# Patient Record
Sex: MALE | Race: OTHER | HISPANIC OR LATINO
[De-identification: names, ages, dates, MRNs, and addresses within clinical notes are randomized per-mention and may not be internally consistent; named-entity substitution may affect disease eponyms.]

---

## 2020-06-09 ENCOUNTER — TRANSCRIPTION ENCOUNTER (OUTPATIENT)
Age: 35
End: 2020-06-09

## 2020-10-19 ENCOUNTER — TRANSCRIPTION ENCOUNTER (OUTPATIENT)
Age: 35
End: 2020-10-19

## 2021-07-09 PROBLEM — Z00.00 ENCOUNTER FOR PREVENTIVE HEALTH EXAMINATION: Status: ACTIVE | Noted: 2021-07-09

## 2021-07-23 ENCOUNTER — APPOINTMENT (OUTPATIENT)
Dept: UROLOGY | Facility: CLINIC | Age: 36
End: 2021-07-23
Payer: COMMERCIAL

## 2021-07-23 VITALS
HEIGHT: 68 IN | WEIGHT: 165 LBS | SYSTOLIC BLOOD PRESSURE: 130 MMHG | DIASTOLIC BLOOD PRESSURE: 72 MMHG | BODY MASS INDEX: 25.01 KG/M2

## 2021-07-23 DIAGNOSIS — Z78.9 OTHER SPECIFIED HEALTH STATUS: ICD-10-CM

## 2021-07-23 PROCEDURE — 99203 OFFICE O/P NEW LOW 30 MIN: CPT

## 2021-07-23 NOTE — PHYSICAL EXAM
[General Appearance - Well Developed] : well developed [General Appearance - Well Nourished] : well nourished [Normal Appearance] : normal appearance [Well Groomed] : well groomed [General Appearance - In No Acute Distress] : no acute distress [Edema] : no peripheral edema [Respiration, Rhythm And Depth] : normal respiratory rhythm and effort [Exaggerated Use Of Accessory Muscles For Inspiration] : no accessory muscle use [Abdomen Soft] : soft [Abdomen Tenderness] : non-tender [Costovertebral Angle Tenderness] : no ~M costovertebral angle tenderness [Urethral Meatus] : meatus normal [Penis Abnormality] : normal uncircumcised penis [Scrotum] : the scrotum was normal [Urinary Bladder Findings] : the bladder was normal on palpation [Testes Tenderness] : no tenderness of the testes [Testes Mass (___cm)] : there were no testicular masses [Prostate Enlargement] : the prostate was not enlarged [Prostate Tenderness] : the prostate was not tender [No Prostate Nodules] : no prostate nodules [Normal Station and Gait] : the gait and station were normal for the patient's age [] : no rash [No Focal Deficits] : no focal deficits [Oriented To Time, Place, And Person] : oriented to person, place, and time [Affect] : the affect was normal [Mood] : the mood was normal [Not Anxious] : not anxious [Inguinal Lymph Nodes Enlarged Bilaterally] : inguinal

## 2021-07-23 NOTE — HISTORY OF PRESENT ILLNESS
[FreeTextEntry1] : He is a 36-year-old man who is seen today for initial visit.  He has no significant urological symptoms.  There is no known family history of urological diseases.  He applied for life insurance and lab values in early June 2021 showed PSA of 3.9.  PSA was repeated a few weeks later and was 4.9 with 6% free PSA.  He is here today to discuss the results.

## 2021-07-23 NOTE — ASSESSMENT
[FreeTextEntry1] : Generally prostate cancer screening is not recommended for this age group.  Regardless PSA level was done and it was elevated and on repeat testing it was more elevated with a low free PSA level.  We had a long discussion regarding his PSA level. We discussed different PSA parameters such as PSA velocity, free PSA and age-adjusted PSA level. Other markers such as 4K score, PCA3, Prostate health Index, etc were reviewed.  We also discussed observation with repeat PSA level, prostate or pelvic MRI and prostate biopsy in detail. Risks and benefits of each option were discussed and questions were answered. Patient was made aware that prostate cancer can exist at any PSA level.\par Prostate biopsy was reviewed in detail. We discussed how the procedure is performed. Preparation with oral antibiotics and Fleet enema was reviewed. Trans-rectal and trans-perineal biopsies were discussed. Risks of biopsy especially bacteremia, UTI, sepsis, bleeding, erectile dysfunction, etc were discussed. He was made aware that biopsy may not be able to diagnose prostate cancer. Questions were answered.  Pending insurance approval, MRI of the prostate will be ordered and then we will discuss the next step on the phone.\par \par Tobias Sierra MD, FACS\par [facility]\par  of Urology\par \par [address], [address]\[place] [address]\par \par [address], [address]\[place] [address]\par \par Tel: [phone]\par Fax: [phone]

## 2021-07-23 NOTE — LETTER BODY
[Dear  ___] : Dear  [unfilled], [Consult Letter:] : I had the pleasure of evaluating your patient, [unfilled]. [Consult Closing:] : Thank you very much for allowing me to participate in the care of this patient.  If you have any questions, please do not hesitate to contact me. [FreeTextEntry1] : [address]\[place] [address]\par [phone]

## 2021-08-04 ENCOUNTER — RESULT REVIEW (OUTPATIENT)
Age: 36
End: 2021-08-04

## 2021-08-04 ENCOUNTER — OUTPATIENT (OUTPATIENT)
Dept: OUTPATIENT SERVICES | Facility: HOSPITAL | Age: 36
LOS: 1 days | End: 2021-08-04
Payer: COMMERCIAL

## 2021-08-04 ENCOUNTER — APPOINTMENT (OUTPATIENT)
Dept: MRI IMAGING | Facility: IMAGING CENTER | Age: 36
End: 2021-08-04
Payer: COMMERCIAL

## 2021-08-04 ENCOUNTER — TRANSCRIPTION ENCOUNTER (OUTPATIENT)
Age: 36
End: 2021-08-04

## 2021-08-04 DIAGNOSIS — R97.20 ELEVATED PROSTATE SPECIFIC ANTIGEN [PSA]: ICD-10-CM

## 2021-08-04 PROCEDURE — A9585: CPT

## 2021-08-04 PROCEDURE — 76498 UNLISTED MR PROCEDURE: CPT

## 2021-08-04 PROCEDURE — 76498 UNLISTED MR PROCEDURE: CPT | Mod: 26

## 2021-08-04 PROCEDURE — 72197 MRI PELVIS W/O & W/DYE: CPT

## 2021-08-04 PROCEDURE — 72197 MRI PELVIS W/O & W/DYE: CPT | Mod: 26

## 2021-08-08 ENCOUNTER — NON-APPOINTMENT (OUTPATIENT)
Age: 36
End: 2021-08-08

## 2021-09-08 ENCOUNTER — APPOINTMENT (OUTPATIENT)
Dept: UROLOGY | Facility: CLINIC | Age: 36
End: 2021-09-08
Payer: COMMERCIAL

## 2021-09-08 PROCEDURE — 99212 OFFICE O/P EST SF 10 MIN: CPT | Mod: 95

## 2021-09-08 NOTE — PHYSICAL EXAM
[General Appearance - Well Developed] : well developed [Normal Appearance] : normal appearance [General Appearance - In No Acute Distress] : no acute distress [] : no respiratory distress [Exaggerated Use Of Accessory Muscles For Inspiration] : no accessory muscle use [Oriented To Time, Place, And Person] : oriented to person, place, and time [Affect] : the affect was normal [Not Anxious] : not anxious

## 2021-09-08 NOTE — HISTORY OF PRESENT ILLNESS
[Home] : at home, [unfilled] , at the time of the visit. [Medical Office: ([facility])___] : at the medical office located in  [Verbal consent obtained from patient] : the patient, [unfilled] [FreeTextEntry1] : Patient is a 36 year old man with a history of elevated PSA. He has been referred for a transperineal fusion guided biopsy with Slick Curtis MD.  PSA 6/3/21 3.9 ng/mL. PSA 6/23/21 4.9 ng/mL, with a free PSA of 6%. A MRI of the prostate was performed which demonstrated a 11 x 7 x 11 mm PI-RADS 4 lesion, left peripheral zone. A 9 x 8 x 9 mm PI-RADS 4, right peripheral zone.  Prostate volume 15 cc. No seminal vesicle invasion, no pelvic lymphadenopathy, no extraprostatic extension noted. Denies any family  history of prostate cancer. He denies ever having a prostate biopsy. He denies any bothersome voiding symptoms.\par

## 2021-09-08 NOTE — ASSESSMENT
[FreeTextEntry1] : Discussed transperineal fusion guided biopsy with the patient today.  Explained to patient that the MRI images and transrectal ultrasound images allows us to retrieve biopsy samples from the lesion seen on the MRI. We will also take samples from a 12 core biopsy template of the prostate to assess for the presence of clinically significant cancer.  Discussed the use of local anesthesia for this procedure, in addition to a Valium 5 mg tablet. Patient understands the need for a designated  if he takes PO Valium. Reviewed the importance of a Fleet enema the morning of the procedure. The patient currently denies being on blood thinners and also denies taking blood pressure medications. Discussed with patient that a transperineal approach has a low risk for infection. Reviewed with the patient that he may experience some blood in the urine for several days and blood in the ejaculation for a few weeks prior to the procedure. Reviewed MRI findings. Reviewed risk and benefits of a transperineal prostate biopsy. \par \par Patient agrees to move forward with transperineal fusion biopsy with Dr. Slick Curtis MD. A written copy of instructions have been sent to the email address on file as requested. Patient has verbalized understanding of the procedure and instructions prior to his biopsy appointment. Arrival time reviewed with patient.\par

## 2021-09-16 ENCOUNTER — APPOINTMENT (OUTPATIENT)
Dept: UROLOGY | Facility: CLINIC | Age: 36
End: 2021-09-16
Payer: COMMERCIAL

## 2021-09-16 ENCOUNTER — OUTPATIENT (OUTPATIENT)
Dept: OUTPATIENT SERVICES | Facility: HOSPITAL | Age: 36
LOS: 1 days | End: 2021-09-16
Payer: COMMERCIAL

## 2021-09-16 VITALS
DIASTOLIC BLOOD PRESSURE: 81 MMHG | TEMPERATURE: 97.5 F | RESPIRATION RATE: 16 BRPM | HEART RATE: 60 BPM | SYSTOLIC BLOOD PRESSURE: 131 MMHG

## 2021-09-16 VITALS — SYSTOLIC BLOOD PRESSURE: 127 MMHG | DIASTOLIC BLOOD PRESSURE: 83 MMHG

## 2021-09-16 DIAGNOSIS — R93.5 ABNORMAL FINDINGS ON DIAGNOSTIC IMAGING OF OTHER ABDOMINAL REGIONS, INCLUDING RETROPERITONEUM: ICD-10-CM

## 2021-09-16 DIAGNOSIS — R97.20 ELEVATED PROSTATE, SPECIFIC ANTIGEN [PSA]: ICD-10-CM

## 2021-09-16 DIAGNOSIS — R35.0 FREQUENCY OF MICTURITION: ICD-10-CM

## 2021-09-16 PROCEDURE — 55700: CPT

## 2021-09-16 PROCEDURE — 76872 US TRANSRECTAL: CPT

## 2021-09-16 PROCEDURE — 76942 ECHO GUIDE FOR BIOPSY: CPT | Mod: 26,59

## 2021-09-16 PROCEDURE — 76942 ECHO GUIDE FOR BIOPSY: CPT | Mod: 59

## 2021-09-16 PROCEDURE — 55700: CPT | Mod: 22

## 2021-09-16 PROCEDURE — 76872 US TRANSRECTAL: CPT | Mod: 26

## 2021-09-16 PROCEDURE — 76377 3D RENDER W/INTRP POSTPROCES: CPT | Mod: 26

## 2021-09-16 RX ORDER — ENEMA 19; 7 G/133ML; G/133ML
7-19 ENEMA RECTAL
Qty: 1 | Refills: 0 | Status: COMPLETED | COMMUNITY
Start: 2021-09-08 | End: 2021-09-16

## 2021-09-16 RX ORDER — DIAZEPAM 5 MG/1
5 TABLET ORAL
Qty: 1 | Refills: 0 | Status: COMPLETED | COMMUNITY
Start: 2021-09-08 | End: 2021-09-16

## 2021-09-17 ENCOUNTER — NON-APPOINTMENT (OUTPATIENT)
Age: 36
End: 2021-09-17

## 2021-09-17 DIAGNOSIS — R97.20 ELEVATED PROSTATE SPECIFIC ANTIGEN [PSA]: ICD-10-CM

## 2021-09-17 DIAGNOSIS — R93.5 ABNORMAL FINDINGS ON DIAGNOSTIC IMAGING OF OTHER ABDOMINAL REGIONS, INCLUDING RETROPERITONEUM: ICD-10-CM

## 2021-09-22 ENCOUNTER — NON-APPOINTMENT (OUTPATIENT)
Age: 36
End: 2021-09-22

## 2021-09-22 LAB — CORE LAB BIOPSY: NORMAL

## 2021-09-27 ENCOUNTER — TRANSCRIPTION ENCOUNTER (OUTPATIENT)
Age: 36
End: 2021-09-27

## 2023-02-17 ENCOUNTER — APPOINTMENT (OUTPATIENT)
Dept: ORTHOPEDIC SURGERY | Facility: CLINIC | Age: 38
End: 2023-02-17
Payer: COMMERCIAL

## 2023-02-17 ENCOUNTER — NON-APPOINTMENT (OUTPATIENT)
Age: 38
End: 2023-02-17

## 2023-02-17 VITALS
OXYGEN SATURATION: 98 % | BODY MASS INDEX: 25.76 KG/M2 | WEIGHT: 170 LBS | HEIGHT: 68 IN | TEMPERATURE: 98.1 F | DIASTOLIC BLOOD PRESSURE: 79 MMHG | HEART RATE: 82 BPM | SYSTOLIC BLOOD PRESSURE: 142 MMHG

## 2023-02-17 DIAGNOSIS — M25.571 PAIN IN RIGHT ANKLE AND JOINTS OF RIGHT FOOT: ICD-10-CM

## 2023-02-17 PROCEDURE — 73610 X-RAY EXAM OF ANKLE: CPT | Mod: RT

## 2023-02-17 PROCEDURE — 99203 OFFICE O/P NEW LOW 30 MIN: CPT

## 2023-02-17 NOTE — DISCUSSION/SUMMARY
[de-identified] : The patient has a bimalleolar equivalent right ankle fracture.  I think that this is an unstable injury and will likely need surgical fixation.  He is referred for surgical consultation.  In the interim he is placed in a cam boot.  He is advised to keep his foot elevated and apply ice.  Consultation will be after the weekend.

## 2023-02-17 NOTE — PHYSICAL EXAM
[Slightly Antalgic] : slightly antalgic [LE] : Sensory: Intact in bilateral lower extremities [DP] : dorsalis pedis 2+ and symmetric bilaterally [PT] : posterior tibial 2+ and symmetric bilaterally [Normal] : Alert and in no acute distress [Poor Appearance] : well-appearing [Acute Distress] : not in acute distress [Obese] : not obese [de-identified] : The patient has no respiratory distress. Mood and affect are normal. The patient is alert and oriented to person, place and time.\par The patient reports no pain with motion of the knees.  Both calves are soft and nontender.  Both Achilles tendons are intact.  There is swelling and tenderness medially and laterally at the ankle.  There is no tenderness of the midfoot or forefoot.  The skin is intact.  There is no lymphedema. [de-identified] : AP, lateral and mortise x-rays of the right ankle demonstrate fracture of the distal fibula.  Mortise view demonstrates widening medial clear space consistent with bimalleolar equivalent injury.
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
## 2023-02-20 ENCOUNTER — APPOINTMENT (OUTPATIENT)
Dept: ORTHOPEDIC SURGERY | Facility: CLINIC | Age: 38
End: 2023-02-20
Payer: COMMERCIAL

## 2023-02-20 VITALS — BODY MASS INDEX: 25.76 KG/M2 | HEIGHT: 68 IN | WEIGHT: 170 LBS

## 2023-02-20 PROCEDURE — 99204 OFFICE O/P NEW MOD 45 MIN: CPT

## 2023-02-20 PROCEDURE — 99214 OFFICE O/P EST MOD 30 MIN: CPT

## 2023-02-20 NOTE — ADDENDUM
[FreeTextEntry1] : IJulio wrote this note acting as a scribe for Dr. Elliot Laura MD on Feb 20, 2023.\par \par Elliot SIMMONS MD, ordering physician, have read and attest that all the information, medical decision making and discharge instructions within are true and accurate on 02/20/2023.

## 2023-02-20 NOTE — PHYSICAL EXAM
[de-identified] : Patient is WDWN, alert, and in no acute distress. Breathing is unlabored. He is grossly oriented to person, place, and time.\par \par Right Ankle:\par He presents to the office NWB, with a CAM boot in place and using crutches to assist with ambulation.\par Inspection: diffuse swelling of the ankle, tenderness at the deltoid ligament and lateral malleolus.\par Sensation intact. \par  [de-identified] : Imaging of the RIGHT ankle (3-views) obtained on 02/17/2023 at Dr. Hamilton's office were reviewed in the office today:\par IMPRESSION:\par AP, lateral and mortise x-rays of the right ankle demonstrate fracture of the distal fibula. Mortise view demonstrates widening medial clear space consistent with bimalleolar equivalent injury.

## 2023-02-20 NOTE — DISCUSSION/SUMMARY
[de-identified] : The underlying pathophysiology was reviewed with the patient. XR films were reviewed with the patient. Discussed at length the nature of the patient’s condition. \par \par 37 year old male with a bimalleolar equivalent right ankle fracture. Discussed with patient non-operative and operative treatment. At this time I recommend surgical intervention given the instability of the injury.\par \par The patient wishes to proceed with ORIF right bimalleolar fracture at this time. The risks and benefits were reviewed with the patient. \par All of his questions were answered. He will meet with our surgical scheduler and schedule for surgery.\par \par I recommend continued ice and elevation. NSAIDs/Tylenol as needed. \par \par All questions answered, understanding verbalized. Patient in agreement with plan of care.
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
## 2023-02-22 ENCOUNTER — OUTPATIENT (OUTPATIENT)
Dept: OUTPATIENT SERVICES | Facility: HOSPITAL | Age: 38
LOS: 1 days | End: 2023-02-22
Payer: COMMERCIAL

## 2023-02-22 VITALS
WEIGHT: 169.98 LBS | RESPIRATION RATE: 15 BRPM | HEIGHT: 68 IN | TEMPERATURE: 98 F | HEART RATE: 72 BPM | DIASTOLIC BLOOD PRESSURE: 85 MMHG | SYSTOLIC BLOOD PRESSURE: 135 MMHG | OXYGEN SATURATION: 100 %

## 2023-02-22 DIAGNOSIS — Z01.818 ENCOUNTER FOR OTHER PREPROCEDURAL EXAMINATION: ICD-10-CM

## 2023-02-22 DIAGNOSIS — S82.841A DISPLACED BIMALLEOLAR FRACTURE OF RIGHT LOWER LEG, INITIAL ENCOUNTER FOR CLOSED FRACTURE: ICD-10-CM

## 2023-02-22 DIAGNOSIS — S82.891A OTHER FRACTURE OF RIGHT LOWER LEG, INITIAL ENCOUNTER FOR CLOSED FRACTURE: ICD-10-CM

## 2023-02-22 DIAGNOSIS — Z98.890 OTHER SPECIFIED POSTPROCEDURAL STATES: Chronic | ICD-10-CM

## 2023-02-22 LAB — SARS-COV-2 RNA SPEC QL NAA+PROBE: SIGNIFICANT CHANGE UP

## 2023-02-22 PROCEDURE — G0463: CPT

## 2023-02-22 PROCEDURE — 87635 SARS-COV-2 COVID-19 AMP PRB: CPT

## 2023-02-22 NOTE — H&P PST ADULT - ASSESSMENT
36 yo male is scheduled for open reduction internal fixation bimalleolar fracture right ankle on 2/24/2023

## 2023-02-22 NOTE — H&P PST ADULT - NSICDXPROCEDURE_GEN_ALL_CORE_FT
PROCEDURES:  Open reduction and internal fixation (ORIF) of bimalleolar fracture of right ankle 22-Feb-2023 14:24:30  Rhiannon Will
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
## 2023-02-22 NOTE — H&P PST ADULT - BLOOD TRANSFUSION, PREVIOUS, PROFILE
Visit Vitals    /78    Pulse 60    Resp 18    Ht 6' 4.5\" (1.943 m)    Wt 186 lb (84.4 kg)    SpO2 98%    BMI 22.35 kg/m2 no

## 2023-02-22 NOTE — H&P PST ADULT - PROBLEM SELECTOR PLAN 1
ORIF bimalleolar fracture right ankle is planned for 2/;24/2023  Covid testing performed  Pre op instructions were reviewed  Best wishes offered

## 2023-02-23 ENCOUNTER — TRANSCRIPTION ENCOUNTER (OUTPATIENT)
Age: 38
End: 2023-02-23

## 2023-02-23 RX ORDER — APREPITANT 80 MG/1
40 CAPSULE ORAL ONCE
Refills: 0 | Status: DISCONTINUED | OUTPATIENT
Start: 2023-02-24 | End: 2023-03-10

## 2023-02-23 RX ORDER — CHLORHEXIDINE GLUCONATE 213 G/1000ML
1 SOLUTION TOPICAL ONCE
Refills: 0 | Status: DISCONTINUED | OUTPATIENT
Start: 2023-02-24 | End: 2023-03-10

## 2023-02-23 NOTE — ASU PATIENT PROFILE, ADULT - NSTOBACCONEVERSMOKERY/N_GEN_A
Previous Rx was sent to [facility]. Patient is now asking for this to be sent to [facility].    Last Visit: 7/18/22 with MD Mookie Miller  Next Appointment: 1/23/23 with MD Mookie Miller  Previous Refill Encounter(s): 3/9/22 #90 with 1 refill    Requested Prescriptions     Pending Prescriptions Disp Refills    losartan-hydroCHLOROthiazide (HYZAAR) 100-25 mg per tablet 90 Tablet 1     Sig: Take 1 Tablet by mouth daily. For [address] [facility] in place:   Recommendation Provided To:    Intervention Detail: New Rx: 1, reason: Patient Preference  Gap Closed?:   Intervention Accepted By:   Time Spent (min): 5 No

## 2023-02-23 NOTE — ASU PATIENT PROFILE, ADULT - FALL HARM RISK - HARM RISK INTERVENTIONS

## 2023-02-24 ENCOUNTER — TRANSCRIPTION ENCOUNTER (OUTPATIENT)
Age: 38
End: 2023-02-24

## 2023-02-24 ENCOUNTER — APPOINTMENT (OUTPATIENT)
Dept: ORTHOPEDIC SURGERY | Facility: HOSPITAL | Age: 38
End: 2023-02-24

## 2023-02-24 ENCOUNTER — OUTPATIENT (OUTPATIENT)
Dept: OUTPATIENT SERVICES | Facility: HOSPITAL | Age: 38
LOS: 1 days | End: 2023-02-24
Payer: COMMERCIAL

## 2023-02-24 VITALS
OXYGEN SATURATION: 100 % | SYSTOLIC BLOOD PRESSURE: 117 MMHG | RESPIRATION RATE: 13 BRPM | DIASTOLIC BLOOD PRESSURE: 64 MMHG | HEART RATE: 63 BPM

## 2023-02-24 VITALS
OXYGEN SATURATION: 100 % | WEIGHT: 169.09 LBS | RESPIRATION RATE: 13 BRPM | HEIGHT: 68 IN | HEART RATE: 73 BPM | DIASTOLIC BLOOD PRESSURE: 67 MMHG | SYSTOLIC BLOOD PRESSURE: 129 MMHG | TEMPERATURE: 98 F

## 2023-02-24 DIAGNOSIS — Z98.890 OTHER SPECIFIED POSTPROCEDURAL STATES: Chronic | ICD-10-CM

## 2023-02-24 DIAGNOSIS — S82.891A OTHER FRACTURE OF RIGHT LOWER LEG, INITIAL ENCOUNTER FOR CLOSED FRACTURE: ICD-10-CM

## 2023-02-24 PROCEDURE — C1713: CPT

## 2023-02-24 PROCEDURE — 76000 FLUOROSCOPY <1 HR PHYS/QHP: CPT

## 2023-02-24 PROCEDURE — 27814 TREATMENT OF ANKLE FRACTURE: CPT | Mod: RT

## 2023-02-24 PROCEDURE — 97161 PT EVAL LOW COMPLEX 20 MIN: CPT

## 2023-02-24 DEVICE — SCREW LOKG SLF-TPNG W/ STARDRIVE RECESS 3.5X12MM: Type: IMPLANTABLE DEVICE | Status: FUNCTIONAL

## 2023-02-24 DEVICE — SCREW CORTEX S-T 2.7X20MM: Type: IMPLANTABLE DEVICE | Status: FUNCTIONAL

## 2023-02-24 DEVICE — PLATE 1/3 TUB LCP W/COLLAR 7H 81MM: Type: IMPLANTABLE DEVICE | Status: FUNCTIONAL

## 2023-02-24 DEVICE — SCREW CORT S-T 3.5X14MM: Type: IMPLANTABLE DEVICE | Status: FUNCTIONAL

## 2023-02-24 DEVICE — SCREW CORT S-T 3.5X12MM: Type: IMPLANTABLE DEVICE | Status: FUNCTIONAL

## 2023-02-24 DEVICE — SCREW LOKG SLF-TPNG W/ STARDRIVE RECESS 3.5X16MM: Type: IMPLANTABLE DEVICE | Status: FUNCTIONAL

## 2023-02-24 DEVICE — IMPLANTABLE DEVICE: Type: IMPLANTABLE DEVICE | Status: FUNCTIONAL

## 2023-02-24 RX ORDER — SODIUM CHLORIDE 9 MG/ML
1000 INJECTION, SOLUTION INTRAVENOUS
Refills: 0 | Status: DISCONTINUED | OUTPATIENT
Start: 2023-02-24 | End: 2023-02-24

## 2023-02-24 RX ORDER — HYDROMORPHONE HYDROCHLORIDE 2 MG/ML
0.5 INJECTION INTRAMUSCULAR; INTRAVENOUS; SUBCUTANEOUS
Refills: 0 | Status: DISCONTINUED | OUTPATIENT
Start: 2023-02-24 | End: 2023-02-24

## 2023-02-24 RX ORDER — CEFAZOLIN SODIUM 1 G
2000 VIAL (EA) INJECTION ONCE
Refills: 0 | Status: COMPLETED | OUTPATIENT
Start: 2023-02-24 | End: 2023-02-24

## 2023-02-24 RX ORDER — ASPIRIN/CALCIUM CARB/MAGNESIUM 324 MG
1 TABLET ORAL
Qty: 60 | Refills: 0
Start: 2023-02-24 | End: 2023-03-25

## 2023-02-24 RX ORDER — ONDANSETRON 8 MG/1
4 TABLET, FILM COATED ORAL ONCE
Refills: 0 | Status: DISCONTINUED | OUTPATIENT
Start: 2023-02-24 | End: 2023-02-24

## 2023-02-24 RX ORDER — IBUPROFEN 200 MG
1 TABLET ORAL
Qty: 30 | Refills: 0
Start: 2023-02-24

## 2023-02-24 RX ORDER — OXYCODONE AND ACETAMINOPHEN 5; 325 MG/1; MG/1
1 TABLET ORAL
Qty: 10 | Refills: 0
Start: 2023-02-24

## 2023-02-24 RX ORDER — ACETAMINOPHEN 500 MG
1000 TABLET ORAL ONCE
Refills: 0 | Status: COMPLETED | OUTPATIENT
Start: 2023-02-24 | End: 2023-02-24

## 2023-02-24 RX ORDER — OXYCODONE HYDROCHLORIDE 5 MG/1
5 TABLET ORAL ONCE
Refills: 0 | Status: DISCONTINUED | OUTPATIENT
Start: 2023-02-24 | End: 2023-02-24

## 2023-02-24 NOTE — ASU DISCHARGE PLAN (ADULT/PEDIATRIC) - CALL YOUR DOCTOR IF YOU HAVE ANY OF THE FOLLOWING:
Fever greater than (need to indicate Fahrenheit or Celsius) Bleeding that does not stop/Swelling that gets worse/Pain not relieved by Medications/Fever greater than (need to indicate Fahrenheit or Celsius)/Numbness, tingling, color or temperature change to extremity

## 2023-02-24 NOTE — BRIEF OPERATIVE NOTE - NSICDXBRIEFPREOP_GEN_ALL_CORE_FT
PRE-OP DIAGNOSIS:  Closed fracture of right ankle, initial encounter 24-Feb-2023 13:09:04  Stu Natarajan

## 2023-02-24 NOTE — PHYSICAL THERAPY INITIAL EVALUATION ADULT - LEVEL OF INDEPENDENCE: GAIT, REHAB EVAL
independent [facility]ing:  I discussed with the patient the risks of golimumab including but not limited to myelosuppression, immunosuppression, autoimmune hepatitis, demyelinating diseases, lymphoma, and serious infections.  The patient understands that monitoring is required including a PPD at baseline and must alert us or the primary physician if symptoms of infection or other concerning signs are noted.
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
## 2023-02-24 NOTE — ASU DISCHARGE PLAN (ADULT/PEDIATRIC) - NS MD DC FALL RISK RISK
For information on Fall & Injury Prevention, visit: [email or web address] OR  [email or web address] OR  https://www.cdc.gov/steadi/patient.html

## 2023-02-24 NOTE — ASU DISCHARGE PLAN (ADULT/PEDIATRIC) - CARE PROVIDER_API CALL
Elliot Laura)  Orthopaedic Surgery  [address]  [place] [address]  Phone: [phone]  Fax: [phone]  Follow Up Time:

## 2023-02-24 NOTE — BRIEF OPERATIVE NOTE - NSICDXBRIEFPROCEDURE_GEN_ALL_CORE_FT
PROCEDURES:  Open reduction and internal fixation (ORIF) of fracture of lateral malleolus of right fibula 24-Feb-2023 13:09:32  Stu Natarajan

## 2023-02-27 PROBLEM — S82.841A DISPLACED BIMALLEOLAR FRACTURE OF RIGHT LOWER LEG, INITIAL ENCOUNTER FOR CLOSED FRACTURE: Chronic | Status: ACTIVE | Noted: 2023-02-22

## 2023-03-02 ENCOUNTER — APPOINTMENT (OUTPATIENT)
Dept: ORTHOPEDIC SURGERY | Facility: CLINIC | Age: 38
End: 2023-03-02
Payer: COMMERCIAL

## 2023-03-02 PROCEDURE — 73610 X-RAY EXAM OF ANKLE: CPT | Mod: RT

## 2023-03-02 PROCEDURE — 29405 APPL SHORT LEG CAST: CPT | Mod: 58,RT

## 2023-03-02 PROCEDURE — 99024 POSTOP FOLLOW-UP VISIT: CPT

## 2023-03-02 NOTE — ADDENDUM
[FreeTextEntry1] : I, Gertrudis Espinoza wrote this note acting as a scribe for Dr. Elliot Laura on Mar 02, 2023.

## 2023-03-02 NOTE — HISTORY OF PRESENT ILLNESS
[de-identified] : s/p ORIF of right ankle and application of short leg splint. [de-identified] : The patient is a 37 year male who returns for the 1st postoperative visit after undergoing ORIF of right ankle and application of short leg splint at [facility]. The surgery was on 02/24/2023. The patient is recovering at home. He reports mild postoperative pain but did not have to take pain medication. He is currently taking aspirin prophylactically. [de-identified] : Patient is WDWN, alert, and in no acute distress. Breathing is unlabored. He is grossly oriented to person, place, and time.\par \par Right Ankle:\par He presents in a short leg splint, which was removed for physical exam.\par No evidence of blistering or skin breakdown.\par Dissolvable sutures in place with overlying steri strips.\par Incision site is healing well, without signs of postoperative infection noted.\par Normal amount of postoperative edema and tenderness.\par Full arc of motion to the toes, with normal sensation. [de-identified] : AP, lateral and oblique views of the RiGHt ankle were obtained today and revealed a bimalleolar fracture stabilized by Synthes sevenhole onethird tubular plate. The hardware is well fixated and the fracture is aligned.  [de-identified] : At this time, he was placed into a right short leg cast in the office today on 3/2/23. Proper cast care instructions were reviewed. He will remain as NWB while the cast is in place. He was instructed on leg lift exercises for quadriceps strengthening while casted. I recommended continued DVT prophylaxis and that he take Calcium Citrate, Vitamin D3 and Vitamin C to promote bone health and healing.\par Follow up in 5 weeks for cast removal and repeat xrays.

## 2023-03-02 NOTE — END OF VISIT
[FreeTextEntry3] : All medical record entries made by the Scribe were at my,  Dr. Elliot Laura MD., direction and personally dictated by me on 03/02/2023. I have personally reviewed the chart and agree that the record accurately reflects my personal performance of the history, physical exam, assessment and plan.

## 2023-03-27 ENCOUNTER — APPOINTMENT (OUTPATIENT)
Dept: ORTHOPEDIC SURGERY | Facility: CLINIC | Age: 38
End: 2023-03-27
Payer: COMMERCIAL

## 2023-03-27 PROCEDURE — 73610 X-RAY EXAM OF ANKLE: CPT | Mod: RT

## 2023-03-27 PROCEDURE — 99024 POSTOP FOLLOW-UP VISIT: CPT

## 2023-03-27 RX ORDER — POLYETHYLENE GLYCOL-3350 AND ELECTROLYTES 236; 6.74; 5.86; 2.97; 22.74 G/274.31G; G/274.31G; G/274.31G; G/274.31G; G/274.31G
236 POWDER, FOR SOLUTION ORAL
Qty: 4000 | Refills: 0 | Status: ACTIVE | COMMUNITY
Start: 2023-02-10

## 2023-03-27 RX ORDER — ASPIRIN 81 MG/1
81 TABLET, COATED ORAL
Qty: 60 | Refills: 0 | Status: ACTIVE | COMMUNITY
Start: 2023-02-24

## 2023-03-27 RX ORDER — OXYCODONE AND ACETAMINOPHEN 5; 325 MG/1; MG/1
5-325 TABLET ORAL
Qty: 10 | Refills: 0 | Status: ACTIVE | COMMUNITY
Start: 2023-02-24

## 2023-03-27 RX ORDER — DOXEPIN HYDROCHLORIDE 10 MG/1
10 CAPSULE ORAL
Qty: 30 | Refills: 0 | Status: ACTIVE | COMMUNITY
Start: 2023-03-09

## 2023-03-27 RX ORDER — LEVOCETIRIZINE DIHYDROCHLORIDE 5 MG/1
5 TABLET ORAL
Qty: 30 | Refills: 0 | Status: ACTIVE | COMMUNITY
Start: 2023-03-09

## 2023-03-27 RX ORDER — IBUPROFEN 600 MG/1
600 TABLET, FILM COATED ORAL
Qty: 30 | Refills: 0 | Status: ACTIVE | COMMUNITY
Start: 2023-02-24

## 2023-03-27 NOTE — END OF VISIT
[FreeTextEntry3] : All medical record entries made by the Scribe were at my,  Dr. Elliot Laura MD., direction and personally dictated by me on 03/27/2023. I have personally reviewed the chart and agree that the record accurately reflects my personal performance of the history, physical exam, assessment and plan.

## 2023-03-27 NOTE — ADDENDUM
[FreeTextEntry1] : I, Gertrudis Espinoza wrote this note acting as a scribe for Dr. Elliot Laura on Mar 27, 2023.

## 2023-03-27 NOTE — HISTORY OF PRESENT ILLNESS
[de-identified] : s/p ORIF of right ankle and application of short leg splint. [de-identified] : The patient is a 37 year male who returns for the 2nd postoperative visit after undergoing ORIF of right ankle and application of short leg splint at [facility]. The surgery was on 02/24/2023. He returns on 3/27/23 for repeat xrays. He notes almost immediately after the cast was placed, he began to experience a rash to diffusely to his body. He was treated with an antihistamine by a dermatologist. He has stopped taking aspirin as he was placed on the antihistamine. [de-identified] : Patient is WDWN, alert, and in no acute distress. Breathing is unlabored. He is grossly oriented to person, place, and time.\par \par Right Ankle:\par He presents in a short leg cast, NWB. The cast was removed in the office today on 3/27/23.\par No evidence of blistering or skin breakdown.\par No signs of erythema.\par Incision site is well healed, without signs of postoperative infection noted.\par Normal amount of postoperative edema and tenderness.\par Ankle ROM is limited status post 4 weeks of immobilization.\par Full arc of motion to the toes, with normal sensation. [de-identified] : AP, lateral and oblique views of the RiGHt ankle were obtained today and revealed a bimalleolar fracture stabilized by Synthes sevenhole onethird tubular plate. The hardware is well fixated and the fracture is aligned. The fracture is healing.  [de-identified] : At this time, the right short leg cast was removed in the office on 3/27/23. He may begin to partially WB with a CAM boot in place and use of crutches for support. He was instructed on ROM and strengthening exercises as well as on Achilles tendon stretching.\par The patient wishes to proceed with physical therapy (WBAT, ROM and strengthening). A script was given.\par Follow up in 6 weeks for repeat xrays. \par \par Finally, I discussed with him that the reason as to why he developed a rash postoperatively is somewhat unclear. I however reassured him that it is not due to the hardware as upon the cast being removed, there were absolutely no signs of infection to the skin. I told him that I would defer to his dermatologist and recommended follow up with them.

## 2023-05-11 ENCOUNTER — APPOINTMENT (OUTPATIENT)
Dept: ORTHOPEDIC SURGERY | Facility: CLINIC | Age: 38
End: 2023-05-11
Payer: COMMERCIAL

## 2023-05-11 DIAGNOSIS — S82.891A OTHER FRACTURE OF RIGHT LOWER LEG, INITIAL ENCOUNTER FOR CLOSED FRACTURE: ICD-10-CM

## 2023-05-11 PROCEDURE — 99024 POSTOP FOLLOW-UP VISIT: CPT

## 2023-05-11 PROCEDURE — 73610 X-RAY EXAM OF ANKLE: CPT | Mod: RT

## 2023-05-11 PROCEDURE — 99214 OFFICE O/P EST MOD 30 MIN: CPT | Mod: 24

## 2023-05-11 NOTE — ADDENDUM
[FreeTextEntry1] : I, Gertrudis Espinoza wrote this note acting as a scribe for Dr. Elliot Laura on May 11, 2023.

## 2023-05-11 NOTE — HISTORY OF PRESENT ILLNESS
[de-identified] : s/p ORIF of right ankle and application of short leg splint. [de-identified] : The patient is a 37 year male who returns for the 3rd postoperative visit after undergoing ORIF of right ankle and application of short leg splint at [facility]. The surgery was on 02/24/2023. He returns on 5/11/23 for repeat xrays. He is progressing well and notes a lot of improvements as of the last 3 weeks. He denies pain but states he has notable improvements in swelling. He still has limited motion and stiffness and is not yet able to run. He is in PT. [de-identified] : Patient is WDWN, alert, and in no acute distress. Breathing is unlabored. He is grossly oriented to person, place, and time.\par \par Right Ankle:\par No evidence of blistering or skin breakdown.\par No signs of erythema.\par Incision site is well healed, without signs of postoperative infection noted.\par Mild residual edema.\par Ankle ROM is improved.\par Full arc of motion to the toes, with normal sensation. [de-identified] : AP, lateral and oblique views of the RiGHt ankle were obtained today and revealed a bimalleolar fracture stabilized by Synthes sevenhole onethird tubular plate. The hardware is well fixated and the fracture is aligned. The fracture is fully healed. [de-identified] : At this time, he may advance his activities as tolerated, without restrictions. \par The patient wishes to proceed with physical therapy. A script was given.\par Follow up in 6 months for repeat xrays.

## 2023-05-11 NOTE — END OF VISIT
[FreeTextEntry3] : All medical record entries made by the Scribe were at my,  Dr. Elliot Laura MD., direction and personally dictated by me on 05/11/2023. I have personally reviewed the chart and agree that the record accurately reflects my personal performance of the history, physical exam, assessment and plan.

## 2023-08-10 ENCOUNTER — APPOINTMENT (OUTPATIENT)
Dept: ORTHOPEDIC SURGERY | Facility: CLINIC | Age: 38
End: 2023-08-10
Payer: COMMERCIAL

## 2023-08-10 VITALS
HEIGHT: 68 IN | DIASTOLIC BLOOD PRESSURE: 72 MMHG | WEIGHT: 170 LBS | SYSTOLIC BLOOD PRESSURE: 132 MMHG | BODY MASS INDEX: 25.76 KG/M2 | HEART RATE: 64 BPM

## 2023-08-10 DIAGNOSIS — M25.561 PAIN IN RIGHT KNEE: ICD-10-CM

## 2023-08-10 PROCEDURE — 73564 X-RAY EXAM KNEE 4 OR MORE: CPT | Mod: RT

## 2023-08-10 PROCEDURE — 99213 OFFICE O/P EST LOW 20 MIN: CPT

## 2023-08-10 NOTE — DISCUSSION/SUMMARY
[de-identified] : The patient reports anterior medial knee pain on the right.  His exam is not consistent with meniscal or ligamentous damage.  He may be having patellofemoral pain secondary to weakness and the recovery from his right ankle fracture.  I recommend a course of physical therapy for strengthening of his leg.  He will be reevaluated in 6 weeks.  The patient is in agreement with the plan.

## 2023-08-10 NOTE — HISTORY OF PRESENT ILLNESS
[de-identified] : The patient presents for evaluation of right knee pain. He believes this may be due to his knee caving in when he fell and broke his ankle in February. He has been experiencing intermittent sharp pain in the knee since May. Pain worsens with deep flexion or stretching in a butterfly or figure of four pose. He hears clicking in the knee with deep flexion. He has tried heat on occasion for pain. He has tried returning to [facility] but has not felt comfortable due to the knee pain.

## 2023-08-10 NOTE — PHYSICAL EXAM
[LE] : Sensory: Intact in bilateral lower extremities [DP] : dorsalis pedis 2+ and symmetric bilaterally [PT] : posterior tibial 2+ and symmetric bilaterally [Normal] : Alert and in no acute distress [Poor Appearance] : well-appearing [Acute Distress] : not in acute distress [Obese] : not obese [de-identified] : The patient has no respiratory distress. Mood and affect are normal. The patient is alert and oriented to person, place and time. There is no pain with active or passive motion of the hips.  There is no tenderness of either hip.  Examination of the right knee demonstrates no focal area of tenderness.  Quadriceps and hamstring function are intact.  There is no instability of collateral or cruciate ligaments.  Range of motion 0 to 120 degrees bilaterally.  Baldemar test is negative.  The calves are soft and nontender. [de-identified] : AP, lateral, tunnel and sunrise x-rays of the right knee taken today demonstrate no fracture, no dislocation and no bony abnormality.

## 2023-08-25 NOTE — ASU DISCHARGE PLAN (ADULT/PEDIATRIC) - DRIVING
No... pcp,   Phone: (   )    -  Fax: (   )    -  Follow Up Time:    CARMEN VANCE  Phone: [phone]  Fax: [phone]  Follow Up Time: 1-3 days

## 2023-09-22 ENCOUNTER — APPOINTMENT (OUTPATIENT)
Dept: ORTHOPEDIC SURGERY | Facility: CLINIC | Age: 38
End: 2023-09-22

## 2023-11-13 ENCOUNTER — APPOINTMENT (OUTPATIENT)
Dept: ORTHOPEDIC SURGERY | Facility: CLINIC | Age: 38
End: 2023-11-13

## 2025-02-05 NOTE — ASU PREOP CHECKLIST - TEMPERATURE IN CELSIUS (DEGREES C)
Continued Stay SW/CM Assessment/Plan of Care Note   Active Substitute Decision Maker (SDM)    There are no active Substitute Decision Maker (SDM) on file.     Progress note: SW l/m via ecin with Nannette and referral update requested. Chart reviewed including request for wheelchair, orders and verbiage sent via ecin to [employer] and [facility], NELLIE to continue to f/u.     Update: SW received message via ecin from [facility] stating that they are waiting on the signed Asrtal order that was faxed yesterday. NELLIE perfectserved pulmonary MD in regards to form and SW later received Epic Secure chat from Pulm NP confirming that form was completed and was placed on chart. NELLIE also received message from Neal at [facility] ([phone]) via phone and ecin stating the order needs to be updated with wording changed to light weight wheelchair as they do not have transport wheelchairs, Neal from [employer] also stating that pt's ins is OON with [facility] but they can submit for prior auth and this can take up to four business days for approval. [facility] stating that they cannot accept DME order attached and they can send an updated order form for SW to give to MD to sign. NELLIE perfectserved attending MD and attending MD updated of above.  Attending MD asking about specific wording needed for order, SW l/m via ecin with [facility] for exact wording and SW to follow up with Neal from [employer] via phone when able.    Update: NELLIE spoke with Neal from [employer] and per Neal, the verbiage that needs to be in the order is light weight wheelchair and when MD signs order, the NPI also needs to be placed. Attending MD perfectserved to be made aware aware. Signed [employer] form fax attached via ecin to NELLIE Brunson to continue to f/u.    Update: Nannette updated that astral form has been fax attached.     Update: Nannette stating that they still do not see form. Astral form manually faxed to [facility]anntete at [phone], NELLIE later received fax confirmation. NELLIE  to continue to f/u.    Update: Updated DME order for lightweight wheelchair sent via ecin to  [employer].     Update: NPI added to [employer] form as requested and manually faxed back to [facility]. SW later received fax confirmation.    See SW/CM flowsheets for other objective data.     36.8

## (undated) DEVICE — Device

## (undated) DEVICE — DRSG STERISTRIPS 0.5 X 4"

## (undated) DEVICE — DRILL BIT SYNTHES ORTHO QC 3FLUTE 2.7X125MM

## (undated) DEVICE — DRSG CURITY GAUZE SPONGE 4 X 4" 12-PLY

## (undated) DEVICE — DRILL BIT SYNTHES ORTHO QC 2.5X110MM

## (undated) DEVICE — SUT VICRYL 3-0 27" RB-1 UNDYED

## (undated) DEVICE — TOURNIQUET CUFF 34" SINGLE PORT W PLC (BLACK)

## (undated) DEVICE — SUT POLYSORB 2-0 30" GS-11 UNDYED

## (undated) DEVICE — DRAPE 3/4 SHEET 52X76"

## (undated) DEVICE — PACK BASIC TIBURON LTXF STRL

## (undated) DEVICE — WARMING BLANKET UPPER ADULT

## (undated) DEVICE — TOURNIQUET ESMARK 4"

## (undated) DEVICE — POSITIONER STRAP ARMBOARD VELCRO TS-30

## (undated) DEVICE — PACK FOOT CUST

## (undated) DEVICE — SUT MONOCRYL 4-0 18" P-3 UNDYED

## (undated) DEVICE — VENODYNE/SCD SLEEVE CALF MEDIUM

## (undated) DEVICE — DRSG MASTISOL